# Patient Record
(demographics unavailable — no encounter records)

---

## 2024-12-04 NOTE — IMAGING
[Facet arthropathy] : Facet arthropathy [Disc space narrowing] : Disc space narrowing [No instability seen on flexion/extension] : No instability seen on flexion/extension [FreeTextEntry1] : L5-s1 Disc height loss

## 2024-12-04 NOTE — HISTORY OF PRESENT ILLNESS
12/01/22                            Claritza Hernandez  [address]  [place] [address]    To Whom It May Concern:    This is to certify Claritza David was evaluated with Andrey Gaffney PA-C on 12/01/22 and is excused through 12/03/2022.            Electronically signed by:  Andrey Gaffney PA-C  [facility]  [address]  [facility] [address]  Dept Phone: [phone]      [Dull/Aching] : dull/aching [Localized] : localized [Constant] : constant [de-identified] : 12/4/2024: 33 year old male presents with acute onset of low back pain after bending to  something. He reports symptoms worsen with ROM. Denies radicular pain, n/t. Has been taking motrin prn.  No PmHx, NKDA [employer]  [] : no [FreeTextEntry1] : Lower back pain

## 2024-12-04 NOTE — ASSESSMENT
[FreeTextEntry1] : 33M with LBP and spasm  PT  We will also provide a prescription for anti-inflammatories.  Discussed major side effects of medication including but not limited to gastritis and acute kidney injury.  He was instructed to take with food and to discontinue use if stomach or esophageal pain developed.  FU 6 week if pain persist consider MRI l spine

## 2025-01-09 NOTE — HISTORY OF PRESENT ILLNESS
[Dull/Aching] : dull/aching [Localized] : localized [Constant] : constant [de-identified] : 1/9/2025: here to follow up on lower back pain. He reports much improvement since last visit, states some soreness on the left side but overall not much pain. DID pot for a few sessions.  Now doing HEP.  has mild left sided tightness at times.   12/4/2024: 33 year old male presents with acute onset of low back pain after bending to  something. He reports symptoms worsen with ROM. Denies radicular pain, n/t. Has been taking motrin prn.  No PmHx, NKDA [employer]  [] : no [FreeTextEntry1] : Lower back pain

## 2025-01-09 NOTE — ASSESSMENT
[FreeTextEntry1] : 33M with LBP and spasm  improved with PT and meloxicam NO longer taking meds doing HEP Back at awork Full duty F[employer] PRN

## 2025-06-25 NOTE — PLAN
[FreeTextEntry1] : All problems, medications and allergies were assessed and reviewed with the patient. The patients' blood was drawn in office and will be followed and evaluated for any issues. Patient was told to notify the office if any issues arise. Patient agreeable with plan

## 2025-06-25 NOTE — HISTORY OF PRESENT ILLNESS
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[facility] Immunizations:  - Covid vaccine- 2 doses of moderna- had covid once-  td- 4 years ago with first child Social hx/Emotional Health: -    Jun 18 2024  3:30PM 32 year M presents for check up : PMH: mildly overweight, Chronic sinusitis (saw ENT- for deviated septum) preDM  vit d def , elev LDL  Soc hx: soc alcohol use, never a smoker,  Allergies: NKA Meds: flonase, Supplements: zinc  -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[facility]   --2 weeks ago had cold symptoms was given Z pack-  now with some episodes of  diarrhea and feels like a knot in stomach mid area- endorses  eating spicy food  and ate out -  took pepto- with some relief yesterday 2 episodes- today 1 episode ,  denying black or blood in stool  denying any fever, palps, CP, lightheadedness or any other acute symptoms   2023 31 year  old male  presents for check up  PMH: mildly overweight, Chronic sinusitis (saw ENT- for deviated septum)  C/o: denies   Allergies: NKA  Meds: flonase,  Supplements:  zinc   Health Maintenance: Immunizations:  - Covid vaccine- 2 doses of moderna- had covid once-     td- 3 years ago with first child  Social hx/Emotional Health: - just had second child a month ago - doing well  -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

## 2025-06-25 NOTE — PHYSICAL EXAM
[No Acute Distress] : no acute distress [Well Nourished] : well nourished [Well Developed] : well developed [Well-Appearing] : well-appearing [Normal Sclera/Conjunctiva] : normal sclera/conjunctiva [PERRL] : pupils equal round and reactive to light [EOMI] : extraocular movements intact [Normal Outer Ear/Nose] : the outer ears and nose were normal in appearance [Normal Oropharynx] : the oropharynx was normal [No JVD] : no jugular venous distention [No Lymphadenopathy] : no lymphadenopathy [Supple] : supple [Thyroid Normal, No Nodules] : the thyroid was normal and there were no nodules present [No Respiratory Distress] : no respiratory distress  [No Accessory Muscle Use] : no accessory muscle use [Clear to Auscultation] : lungs were clear to auscultation bilaterally [Normal Rate] : normal rate  [Regular Rhythm] : with a regular rhythm [Normal S1, S2] : normal S1 and S2 [No Murmur] : no murmur heard [No Carotid Bruits] : no carotid bruits [No Abdominal Bruit] : a ~M bruit was not heard ~T in the abdomen [No Varicosities] : no varicosities [Pedal Pulses Present] : the pedal pulses are present [No Edema] : there was no peripheral edema [No Palpable Aorta] : no palpable aorta [No Extremity Clubbing/Cyanosis] : no extremity clubbing/cyanosis [Soft] : abdomen soft [Non Tender] : non-tender [Non-distended] : non-distended [No Masses] : no abdominal mass palpated [No HSM] : no HSM [Normal Bowel Sounds] : normal bowel sounds [Normal] : normal [Suprapubic] : in the suprapubic area [Normal Posterior Cervical Nodes] : no posterior cervical lymphadenopathy [Normal Anterior Cervical Nodes] : no anterior cervical lymphadenopathy [No CVA Tenderness] : no CVA  tenderness [No Spinal Tenderness] : no spinal tenderness [No Joint Swelling] : no joint swelling [Grossly Normal Strength/Tone] : grossly normal strength/tone [No Rash] : no rash [Coordination Grossly Intact] : coordination grossly intact [No Focal Deficits] : no focal deficits [Normal Gait] : normal gait [Deep Tendon Reflexes (DTR)] : deep tendon reflexes were 2+ and symmetric [Normal Affect] : the affect was normal [Normal Insight/Judgement] : insight and judgment were intact [Firm] : not firm [Rigid] : not rigid [Rebound] : no rebound [Guarding] : no guarding [Fitzpatrick's] : a negative Fitzpatrick's sign [Rovsing's] : a negative Rovsing's sign [Obturator] : a negative obturator sign [Psoas] : a negative psoas sign [de-identified] : 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

## 2025-06-25 NOTE — HEALTH RISK ASSESSMENT
[Good] : ~his/her~  mood as  good [Yes] : Yes [2 - 3 times a week (3 pts)] : 2 - 3  times a week (3 points) [1 or 2 (0 pts)] : 1 or 2 (0 points) [Never (0 pts)] : Never (0 points) [No] : In the past 12 months have you used drugs other than those required for medical reasons? No [0] : 2) Feeling down, depressed, or hopeless: Not at all (0) [PHQ-2 Negative - No further assessment needed] : PHQ-2 Negative - No further assessment needed [Time Spent: ___ Minutes] : I spent [unfilled] minutes performing a depression screening for this patient. [Never] : Never [HIV test declined] : HIV test declined [Hepatitis C test declined] : Hepatitis C test declined [None] : None [With Significant Other] : lives with significant other [With Family] : lives with family [Employed] : employed [] :  [# Of Children ___] : has [unfilled] children [Sexually Active] : sexually active [Feels Safe at Home] : Feels safe at home [Fully functional (bathing, dressing, toileting, transferring, walking, feeding)] : Fully functional (bathing, dressing, toileting, transferring, walking, feeding) [Fully functional (using the telephone, shopping, preparing meals, housekeeping, doing laundry, using] : Fully functional and needs no help or supervision to perform IADLs (using the telephone, shopping, preparing meals, housekeeping, doing laundry, using transportation, managing medications and managing finances) [Smoke Detector] : smoke detector [Carbon Monoxide Detector] : carbon monoxide detector [Safety elements used in home] : safety elements used in home [Seat Belt] :  uses seat belt [Sunscreen] : uses sunscreen [de-identified] : denies  [de-identified] : denies  [Audit-CScore] : 3 [de-identified] : exercises  [de-identified] : regular  [[id number]] : 0 [Change in mental status noted] : No change in mental status noted [Language] : denies difficulty with language [Reports changes in hearing] : Reports no changes in hearing [Reports changes in vision] : Reports no changes in vision [Reports changes in dental health] : Reports no changes in dental health [ColonoscopyComments] : denying FH  [FreeTextEntry2] : PO- [place]  [de-identified] : is due for opth check up - reinforced importance of follow up- Lasik - 2010-  [de-identified] : advised to follow up and see Derm - for TBSE  [FreeTextEntry4] : EC - Spouse [place] [phone]